# Patient Record
Sex: MALE
[De-identification: names, ages, dates, MRNs, and addresses within clinical notes are randomized per-mention and may not be internally consistent; named-entity substitution may affect disease eponyms.]

---

## 2017-04-17 ENCOUNTER — HOSPITAL ENCOUNTER (EMERGENCY)
Dept: HOSPITAL 68 - ERH | Age: 47
End: 2017-04-17
Payer: COMMERCIAL

## 2017-04-17 VITALS — DIASTOLIC BLOOD PRESSURE: 52 MMHG | SYSTOLIC BLOOD PRESSURE: 163 MMHG

## 2017-04-17 VITALS — BODY MASS INDEX: 30.8 KG/M2 | WEIGHT: 220 LBS | HEIGHT: 71 IN

## 2017-04-17 DIAGNOSIS — R10.32: ICD-10-CM

## 2017-04-17 DIAGNOSIS — R10.31: Primary | ICD-10-CM

## 2017-04-17 LAB
ABSOLUTE GRANULOCYTE CT: 2.8 /CUMM (ref 1.4–6.5)
BASOPHILS # BLD: 0 /CUMM (ref 0–0.2)
BASOPHILS NFR BLD: 0.6 % (ref 0–2)
EOSINOPHIL # BLD: 0.2 /CUMM (ref 0–0.7)
EOSINOPHIL NFR BLD: 4 % (ref 0–5)
ERYTHROCYTE [DISTWIDTH] IN BLOOD BY AUTOMATED COUNT: 13.9 % (ref 11.5–14.5)
GRANULOCYTES NFR BLD: 46.8 % (ref 42.2–75.2)
HCT VFR BLD CALC: 42.7 % (ref 42–52)
LYMPHOCYTES # BLD: 2.1 /CUMM (ref 1.2–3.4)
MCH RBC QN AUTO: 28.1 PG (ref 27–31)
MCHC RBC AUTO-ENTMCNC: 33 G/DL (ref 33–37)
MCV RBC AUTO: 85.2 FL (ref 80–94)
MONOCYTES # BLD: 0.8 /CUMM (ref 0.1–0.6)
PLATELET # BLD: 225 /CUMM (ref 130–400)
PMV BLD AUTO: 8.2 FL (ref 7.4–10.4)
RED BLOOD CELL CT: 5.01 /CUMM (ref 4.7–6.1)
WBC # BLD AUTO: 6 /CUMM (ref 4.8–10.8)

## 2017-04-17 NOTE — CT SCAN REPORT
EXAMINATION:
CT ABDOMEN AND PELVIS WITHOUT CONTRAST
 
CLINICAL INFORMATION:
Left flank pain. Rule out kidney stone.
 
COMPARISON:
CT abdomen and pelvis of 10/21/2016 and 08/23/2016.
 
TECHNIQUE:
Multidetector volumetric imaging was performed from the superior aspect of
the liver through the pubic symphysis. Sagittal and coronal reformatted
images were obtained on the technologist's workstation.
 
DLP:
591.46 mGy-cm.
 
FINDINGS:
 
LUNG BASES: The visualized lung bases are unremarkable.
 
LIVER, GALLBLADDER, AND BILIARY TREE: The liver is normal in size and shape.
There is low-attenuation of the liver parenchyma which could be related to
underlying steatosis. No focal hepatic lesion is noted within the limits of
noncontrast study. No biliary ductal dilatation. The gallbladder is
significantly contracted, however there is no evidence of radiopaque
gallstones or obvious pericholecystic inflammatory changes.
 
PANCREAS: Unremarkable.
 
SPLEEN: Unremarkable.
 
ADRENAL GLANDS: Unremarkable.
 
KIDNEYS AND URETERS: The kidneys are normal in size, shape and attenuation.
There is no evidence of hydroureteronephrosis or perinephric stranding.
Multiple nonobstructing left renal calculi are again noted including a 0.6 x
0.9 cm calculus in the mid kidney and at least 4-5 calculi in the lower pole
ranging from 0.3 cm to a largest one measuring 0.7 cm. No evidence of
radiopaque ureteral calculi.
 
BLADDER: Partially distended, however, unremarkable without evidence of
radiopaque stones, wall thickening or soft tissue mass.
 
GASTROINTESTINAL TRACT: The small and large bowel are unremarkable. The
appendix is unremarkable. The stomach is partially distended and
unremarkable.
 
ABDOMINAL WALL: No significant hernia is appreciated.
 
LYMPH NODES: No evidence of pathologically enlarged lymph nodes.
 
VASCULAR: Unremarkable.
 
PELVIC VISCERA: The prostate is normal in size, however there are coarse
prostatic calcifications. Normal seminal vesicles.
 
OSSEOUS STRUCTURES: Unremarkable.
 
IMPRESSION:
1. Multiple nonobstructing left renal calculi are overall not significantly
changed compared to previous study of 10/21/2016. No hydroureteronephrosis or
perinephric stranding.
 
2. No evidence of radiopaque right urinary tract calculi or bladder calculi.
 
3. No additional acute abnormalities are identified to explain patient's
symptoms.
 
4. Suspected hepatic steatosis.

## 2017-04-17 NOTE — ED GI/GU/ABDOMINAL COMPLAINT
History of Present Illness
 
General
Chief Complaint: Male Genitourinary Problems
Stated Complaint: MALE 
Source: patient, old records
Exam Limitations: no limitations
 
Vital Signs & Intake/Output
Vital Signs & Intake/Output
 Vital Signs
 
 
Date Time Temp Pulse Resp B/P Pulse O2 O2 Flow FiO2
 
     Ox Delivery Rate 
 
04/17 2151 97.2 98 20 163/52 98 Room Air  
 
04/17 2103 97.4 90 20 165/71 99 Room Air  
 
04/17 1943      Room Air  
 
04/17 1913 99.1 90 18 155/83 98 Room Air  
 
 
Allergies
Coded Allergies:
NO KNOWN ALLERGIES (08/22/16)
 
Reconcile Medications
Glyburide 2.5 MG TABLET   1 TAB PO BID DM  (Reported)
Metformin HCl 1,000 MG TABLET   1 TAB PO BID DIABETES  (Reported)
Sitagliptin Phosphate (Januvia) 100 MG TABLET   1 TAB PO DAILY DM  (Reported)
 
Triage Note:
PT TO ED C/O BURNING WITH URINATION FOR 3 DAYS.
HAD LEFT FLANK PAIN 3 DAYS AGO, NOW HAS PAIN
ACROSS LOW ABDOMEN.  PMH OF KIDNEY STONES AND
NIDDM.
Triage Nurses Notes Reviewed? yes
HPI:
Patient presents with three-day history of dysuria and increased urinary 
frequency.  Last night he developed pain to his left flank.  This morning the 
pain in his left flank was on however he has been having pain in the suprapubic 
area.  The pain is constant.  The pain is burning in nature.  There is no 
radiation.  The pain is constant.  There is no aggravating or mitigating 
factors.  He rates the pain as 10 out of 10.  Patient states that he has a 
history of kidney stones and had similar symptoms in the past with kidney 
stones.  Patient denies any nausea or vomiting.  There are no fevers or chills. 
He has not noticed any hematuria.
 
Past History
 
Travel History
Traveled to [place] past 21 day No
 
Medical History
Any Pertinent Medical History? see below for history
Neurological: NONE
EENT: NONE
Cardiovascular: NONE
Respiratory: NONE
Gastrointestinal: NONE
Hepatic: NONE
Renal: nephrolithiasis
Musculoskeletal: NONE
Psychiatric: NONE
Endocrine: NIDDM
Blood Disorders: NONE
Cancer(s): NONE
GYN/Reproductive: NONE
 
Surgical History
Surgical History: hernia repair-inguinal
 
Psychosocial History
What is your primary language Macedonian
Tobacco Use: Never used
ETOH Use: denies use
Illicit Drug Use: denies illicit drug use
 
Family History
Hx Contributory? No
 
Review of Systems
 
Review of Systems
Constitutional:
Reports: no symptoms. 
EENTM:
Reports: no symptoms. 
Respiratory:
Reports: no symptoms. 
Cardiovascular:
Reports: no symptoms. 
GI:
Reports: see HPI, abdominal pain. 
Genitourinary:
Reports: see HPI, dysuria, frequency. 
Musculoskeletal:
Reports: see HPI, back pain. 
Skin:
Reports: no symptoms. 
Neurological/Psychological:
Reports: no symptoms. 
Hematologic/Endocrine:
Reports: no symptoms. 
Immunologic/Allergic:
Reports: no symptoms. 
All Other Systems: Reviewed and Negative
 
Physical Exam
 
Physical Exam
General Appearance: well developed/nourished, alert, awake, moderate distress
Head: atraumatic, normal appearance
Eyes:
Bilateral: PERRL, EOMI. 
Ears, Nose, Throat, Mouth: hearing grossly normal, moist mucous membrane
Neck: normal inspection, supple, full range of motion
Respiratory: normal breath sounds, chest non-tender, no respiratory distress, 
lungs clear
Cardiovascular: regular rate/rhythm, normal peripheral pulses
Gastrointestinal: normal bowel sounds, soft, no organomegaly, tenderness (
BILATERAL LOWER QUADRANT), NO REBOUND OR GUARDING
Back: normal inspection, normal range of motion, NO cva TENDERNESS
Extremities: normal range of motion
Neurologic/Psych: no motor/sensory deficits, awake, alert, oriented x 3, normal 
gait, normal mood/affect
Skin: intact, normal color, warm/dry
 
Core Measures
ACS in differential dx? No
Severe Sepsis Present: No
Septic Shock Present: No
 
Progress
Differential Diagnosis: appendicitis, diverticulitis, epididymitis, ischemic 
bowel, inflamm bowel dis, prostatitis, PUD/GERD, ureterolithiasis, urinary 
retention, UTI/pyelo
Plan of Care:
 Orders
 
 
Procedure Date/time Status
 
COMPREHENSIVE METABOLIC PANEL 04/17 1951 Complete
 
CBC WITHOUT DIFFERENTIAL 04/17 1951 Complete
 
ACETONE 04/17 1951 Complete
 
URINALYSIS 04/17 1920 Complete
 
 
 Laboratory Tests
 
 
 
04/17/17 2120:
Anion Gap 11, Estimated GFR > 60, BUN/Creatinine Ratio 15.0, Glucose 245  H, 
Calcium 8.7, Total Bilirubin 0.5, AST 28, ALT 65, Alkaline Phosphatase 88, Total
Protein 6.6, Albumin 3.9, Globulin 2.7, Albumin/Globulin Ratio 1.4, CBC w Diff 
NO MAN DIFF REQ, RBC 5.01, MCV 85.2, MCH 28.1, RDW 13.9, MPV 8.2, Gran % 46.8, 
Lymphocytes % 34.6, Monocytes % 14.0  H, Eosinophils % 4.0, Basophils % 0.6, 
Absolute Granulocytes 2.8, Absolute Lymphocytes 2.1, Absolute Monocytes 0.8  H, 
Absolute Eosinophils 0.2, Absolute Basophils 0, PUBS MCHC 33.0, Acetone Level 
NEGATIVE
 
04/17/17 1932:
Urine Color YEL, Urine Clarity HAZY  H, Urine pH 6.5, Ur Specific Gravity 1.015,
Urine Protein NEG, Urine Ketones TRACE  H, Urine Nitrite NEG, Urine Bilirubin 
NEG, Urine Urobilinogen 0.2, Ur Leukocyte Esterase NEG, Ur Microscopic SEDIMENT 
EXAMINED, Urine RBC 1-3, Urine WBC 15-25  H, Ur Epithelial Cells FEW, Urine 
Bacteria FEW  H, Urine Hemoglobin TRACE-INTACT  H, Urine Glucose >=1000  H
Initial ED EKG: none
 
Departure
 
Departure
Disposition: HOME OR SELF CARE
Condition: Stable
Clinical Impression
Primary Impression: Lower abdominal pain, unspecified
Referrals:
VINCENT CARBAJAL,JELANI YUN (PCP/Family)
 
Additional Instructions:
Drink plenty of fluids.  Return if symptoms worsen or for any concerns.
Departure Forms:
Customer Survey
General Discharge Information
Prescriptions:
Current Visit Scripts
Oxycodone HCl/Acetaminophen (Percocet 5-325 MG Tablet) 1-2 TAB PO Q6P PRN PAIN
     #20 TAB

## 2018-07-02 ENCOUNTER — HOSPITAL ENCOUNTER (EMERGENCY)
Dept: HOSPITAL 68 - ERH | Age: 48
End: 2018-07-02
Payer: COMMERCIAL

## 2018-07-02 VITALS — WEIGHT: 225 LBS | HEIGHT: 71 IN | BODY MASS INDEX: 31.5 KG/M2

## 2018-07-02 VITALS — SYSTOLIC BLOOD PRESSURE: 141 MMHG | DIASTOLIC BLOOD PRESSURE: 70 MMHG

## 2018-07-02 DIAGNOSIS — N23: Primary | ICD-10-CM

## 2018-07-02 LAB
ABSOLUTE GRANULOCYTE CT: 2.4 /CUMM (ref 1.4–6.5)
BASOPHILS # BLD: 0.1 /CUMM (ref 0–0.2)
BASOPHILS NFR BLD: 0.7 % (ref 0–2)
EOSINOPHIL # BLD: 0.3 /CUMM (ref 0–0.7)
EOSINOPHIL NFR BLD: 4.4 % (ref 0–5)
ERYTHROCYTE [DISTWIDTH] IN BLOOD BY AUTOMATED COUNT: 14.3 % (ref 11.5–14.5)
GRANULOCYTES NFR BLD: 34 % (ref 42.2–75.2)
HCT VFR BLD CALC: 46.6 % (ref 42–52)
LYMPHOCYTES # BLD: 3.4 /CUMM (ref 1.2–3.4)
MCH RBC QN AUTO: 28 PG (ref 27–31)
MCHC RBC AUTO-ENTMCNC: 32.9 G/DL (ref 33–37)
MCV RBC AUTO: 85 FL (ref 80–94)
MONOCYTES # BLD: 0.9 /CUMM (ref 0.1–0.6)
PLATELET # BLD: 298 /CUMM (ref 130–400)
PMV BLD AUTO: 8.5 FL (ref 7.4–10.4)
RED BLOOD CELL CT: 5.48 /CUMM (ref 4.7–6.1)
WBC # BLD AUTO: 7.1 /CUMM (ref 4.8–10.8)

## 2018-07-02 NOTE — ED GI/GU/ABDOMINAL COMPLAINT
**See Addendum**
History of Present Illness
 
General
Chief Complaint: Abdominal Pain/Flank Pain
Stated Complaint: PT C/C LT FLANK PAIN HX KIDNEY STONES
Source: patient
Exam Limitations: no limitations
 
Vital Signs & Intake/Output
Vital Signs & Intake/Output
 Vital Signs
 
 
Date Time Temp Pulse Resp B/P B/P Pulse O2 O2 Flow FiO2
 
     Mean Ox Delivery Rate 
 
07/02 0607 98.0 88 18 142/88  98 Room Air  
 
07/02 0340 97.7 95 16 175/111  98 Room Air Room Air 
 
 
 
Allergies
Coded Allergies:
NO KNOWN ALLERGIES (UNKNOWN 07/02/18)
 
Reconcile Medications
Glipizide 10 MG TABLET   1 TAB PO BID DM  (Reported)
Ketorolac Tromethamine 10 MG TABLET   1 TAB PO Q6P PRN KIDNEY STONE PAIN
     PATIENT RECEIVED iv KETOROLAC IN THE EMERGENCY DEPARTMENT
Meloxicam 7.5 MG TABLET   1 TAB PO DAILY PAIN/INFLAMMATION  (Reported)
Meloxicam (Mobic) 15 MG TABLET   1 TAB PO DAILY PRN pain
Metformin HCl 1,000 MG TABLET   1 TAB PO BID DIABETES  (Reported)
Nystatin 100,000 UNIT/GRAM CREAM..G.   1 LEIGH ANN TOP AD PRN GROIN  (Reported)
     apply to affected area(s)
Oxycodone HCl/Acetaminophen (Percocet 5-325 MG Tablet) 5 MG-325 MG TABLET   1 
TAB PO Q6P PRN pain
Pravastatin Sodium 20 MG TABLET   1 TAB PO DAILY CHOLESTEROL  (Reported)
Sitagliptin Phosphate (Januvia) 100 MG TABLET   1 TAB PO DAILY DM  (Reported)
Tamsulosin HCl 0.4 MG CAP.ER.24H   2 CAP PO DAILY   (Reported)
 
Triage Note:
49YO MALE TO RM 6 W/CO LOW ABD PAIN THAT RADIATES
 TO R GROIN AND FLANK THAT HAS BEEN PRESENT
 INTERMITTENTLY X WEEKS BUT WORSENED TONITE.
 ALSO STATES HE HAS HX OF KIDNEY STONES AND PASSED
 "A GRAIN OF SAND LAST WEEK"
Triage Nurses Notes Reviewed? yes
HPI:
Patient presents for evaluation of a severe left flank pain that began one week 
ago.  Patient states he has been passing small kidney stones but it hasn't 
seemed to have relieved the left flank pain he is currently experiencing.  He 
states he has the feeling like he has to urinate but can't.  He denies any 
associated fever or cold symptoms or hematuria.
 
Past History
 
Travel History
Traveled to [place] past 21 day No
 
Medical History
Any Pertinent Medical History? see below for history
Neurological: NONE
EENT: NONE
Cardiovascular: NONE
Respiratory: NONE
Gastrointestinal: NONE
Hepatic: NONE
Renal: nephrolithiasis
Musculoskeletal: NONE
Psychiatric: NONE
Endocrine: NIDDM
Blood Disorders: NONE
Cancer(s): NONE
GYN/Reproductive: NONE
 
Surgical History
Surgical History: hernia repair-inguinal
 
Psychosocial History
What is your primary language Sami
Tobacco Use: Never used
 
Family History
Hx Contributory? No
 
Review of Systems
 
Review of Systems
Constitutional:
Reports: no symptoms. 
EENTM:
Reports: no symptoms. 
Respiratory:
Reports: no symptoms. 
Cardiovascular:
Reports: no symptoms. 
GI:
Reports: no symptoms. 
Genitourinary:
Reports: see HPI. 
Musculoskeletal:
Reports: no symptoms. 
Skin:
Reports: no symptoms. 
Neurological/Psychological:
Reports: no symptoms. 
Hematologic/Endocrine:
Reports: no symptoms. 
Immunologic/Allergic:
Reports: no symptoms. 
All Other Systems: Reviewed and Negative
 
Physical Exam
 
Physical Exam
Gastrointestinal: see below
Comments:
Gen.: Well-nourished, well-developed, no acute respiratory distress.
Head: Normocephalic, atraumatic.
Eyes: Normal inspection bilaterally
Ears: Normal inspection bilaterally
Nose: Normal inspection
Throat/mouth : Moist mucosa 
Neck: Supple, full range of motion, no goiter
Heart: Regular rate and rhythm, no murmurs rubs or gallops
Lungs: Clear to auscultation bilaterally with normal air entry
Chest: Nontender
Back: Normal range of motion, mild left flank tenderness to percussion
Abdomen: Soft, mild left lower quadrant abdominal tenderness without rebound or 
guarding, nondistended, normal bowel sounds
Extremities: Normal range of motion grossly, equal radial pulses, no cyanosis 
clubbing or edema
Neurologic: Cranial nerves grossly intact, speech is clear
Skin: warm and dry
Psychiatric: Calm, cooperative, no apparent delusions or hallucinations
 
 
Core Measures
ACS in differential dx? No
Sepsis Present: No
Sepsis Focused Exam Completed? No
 
Progress
Differential Diagnosis: renal colic, pyelonephritis/UTI, musculoskeletal strain,
diverticulitis
Plan of Care:
 Orders
 
 
Procedure Date/time Status
 
CT ABD & PELVIS W/O IV CONTRAS 07/02 0426 Active
 
URINALYSIS 07/02 0357 Complete
 
COMPREHENSIVE METABOLIC PANEL 07/02 0357 Complete
 
CBC WITHOUT DIFFERENTIAL 07/02 0357 Complete
 
 
 Current Medications
 
 
  Sig/Gloria Start time  Last
 
Medication Dose  Stop Time Status Admin
 
Oxycodone/ 1 TAB ONCE ONE 07/02 0630 UNVr 
 
Acetaminophen   07/02 0631  
 
(Percocet)     
 
 
 Laboratory Tests
 
 
 
07/02/18 0500:
Urinalysis LIGHT  H, Urine Color YEL, Urine Clarity HAZY  H, Urine pH 6.0, Ur 
Specific Gravity 1.020, Urine Protein NEG, Urine Ketones NEG, Urine Nitrite NEG,
Urine Bilirubin NEG, Urine Urobilinogen 0.2, Ur Leukocyte Esterase TRACE  H, Ur 
Microscopic SEDIMENT EXAMINED, Urine RBC 10-15  H, Urine WBC 5-10  H, Ur 
Epithelial Cells FEW, Urine Crystals   , Urine Bacteria RARE  H, Urine Mucus MOD
 H, Urine Hemoglobin TRACE-INTACT  H, Urine Glucose 250  H
 
07/02/18 0345:
Anion Gap 13, Estimated GFR > 60, BUN/Creatinine Ratio 17.5, Glucose 209  H, 
Calcium 9.8, Total Bilirubin 0.5, AST 25, ALT 49, Alkaline Phosphatase 105, 
Total Protein 7.6, Albumin 4.5, Globulin 3.1, Albumin/Globulin Ratio 1.5, CBC w 
Diff NO MAN DIFF REQ, RBC 5.48, MCV 85.0, MCH 28.0, MCHC 32.9  L, RDW 14.3, MPV 
8.5, Gran % 34.0  L, Lymphocytes % 48.2, Monocytes % 12.7  H, Eosinophils % 4.4,
Basophils % 0.7, Absolute Granulocytes 2.4, Absolute Lymphocytes 3.4, Absolute 
Monocytes 0.9  H, Absolute Eosinophils 0.3, Absolute Basophils 0.1
 
Initial ED EKG: none
Comments:
7/2/2018 6:21:16 AM Trav is feeling much better.  He states he no longer has 
the left flank pain and that the pain seems to have migrated down into the 
suprapubic region.  He has gone to the bathroom repeatedly and has had trouble 
"passing his urine".  His clinical presentation is consistent with his prior 
history of renal colic.  He has repeatedly declined to have a CAT scan done due 
to the radiation exposure.  Stone seems to be progressing swiftly and I feel he 
is stable for outpatient management with pain medication and follow up with his 
urologist.
 
Departure
 
Departure
Disposition: HOME OR SELF CARE
Condition: Stable
Clinical Impression
Primary Impression: Renal colic on left side
Referrals:
John CARBAJAL,Idania Ryan (PCP/Family)
 
Additional Instructions:
Ketorolac as prescribed for pain, add Percocet if necessary.  Follow-up with 
your urologist within 48 hours if the stone has not passed.  Return if any 
concerns or sudden worsening.
 
Thank you for choosing the [facility] for your care.
It was a pleasure to serve you today.
 
Socrates Raya M.D.
[facility]
Departure Forms:
Customer Survey
General Discharge Information
Prescriptions:
Current Visit Scripts
Ketorolac Tromethamine 1 TAB PO Q6P PRN KIDNEY STONE PAIN 
     #16 TAB 
     PATIENT RECEIVED iv KETOROLAC IN THE EMERGENCY DEPARTMENT
 
Oxycodone HCl/Acetaminophen (Percocet 5-325 MG Tablet) 1 TAB PO Q6P PRN pain 
     #10 TAB

## 2018-07-02 NOTE — ULTRASOUND REPORT
EXAMINATION:
US SCROTUM
 
CLINICAL INFORMATION:
48-year-old male patient with right testicular pain.
 
COMPARISON:
Scrotal ultrasound on 05/09/2016. (Microlithiasis. Otherwise normal).
 
TECHNIQUE:
A sonogram of the scrotum was performed assessing gray-scale appearance and
color Doppler flow. Spectral analysis and Doppler interrogation was
performed.
 
FINDINGS:
 
RIGHT: Right testicle measures 4.2 x 2.3 x 3.3 cm, volume 22 mL. Parenchymal
echotexture is normal. No focal testicular parenchymal lesions are
visualized. Again, scattered microlithiasis is present. Normal symmetric
arterial and venous intratesticular flow is visualized.
 
Right epididymal head is normal in size. No right hydrocele or varicocele is
seen.
 
LEFT: Left testicle measures 3.7 x 2.0 x 3.2 cm, volume 16.8 mL. Parenchymal
echotexture is normal. Again, scattered microlithiasis is present. No focal
testicular parenchymal lesions are visualized. Normal symmetric arterial and
venous intratesticular flow is visualized.
 
Left epididymal head is normal in size. A small left varicocele is present.
 
IMPRESSION:
No evidence of testicular torsion. Scattered bilateral microlithiasis.